# Patient Record
Sex: MALE | Race: BLACK OR AFRICAN AMERICAN | URBAN - METROPOLITAN AREA
[De-identification: names, ages, dates, MRNs, and addresses within clinical notes are randomized per-mention and may not be internally consistent; named-entity substitution may affect disease eponyms.]

---

## 2017-05-25 ENCOUNTER — RADIANT APPOINTMENT (OUTPATIENT)
Dept: GENERAL RADIOLOGY | Facility: CLINIC | Age: 32
End: 2017-05-25
Attending: PHYSICIAN ASSISTANT
Payer: COMMERCIAL

## 2017-05-25 ENCOUNTER — OFFICE VISIT (OUTPATIENT)
Dept: URGENT CARE | Facility: URGENT CARE | Age: 32
End: 2017-05-25
Payer: COMMERCIAL

## 2017-05-25 VITALS
WEIGHT: 169 LBS | DIASTOLIC BLOOD PRESSURE: 63 MMHG | SYSTOLIC BLOOD PRESSURE: 108 MMHG | HEIGHT: 73 IN | TEMPERATURE: 96.5 F | HEART RATE: 63 BPM | OXYGEN SATURATION: 97 % | BODY MASS INDEX: 22.4 KG/M2

## 2017-05-25 DIAGNOSIS — R05.9 COUGH: ICD-10-CM

## 2017-05-25 DIAGNOSIS — R05.9 COUGH: Primary | ICD-10-CM

## 2017-05-25 PROCEDURE — 71020 XR CHEST 2 VW: CPT

## 2017-05-25 PROCEDURE — 99213 OFFICE O/P EST LOW 20 MIN: CPT | Performed by: PHYSICIAN ASSISTANT

## 2017-05-25 RX ORDER — PSEUDOEPHEDRINE HCL 120 MG/1
120 TABLET, FILM COATED, EXTENDED RELEASE ORAL EVERY 12 HOURS
Qty: 20 TABLET | Refills: 0 | Status: SHIPPED | OUTPATIENT
Start: 2017-05-25

## 2017-05-25 RX ORDER — LORATADINE 10 MG/1
10 TABLET ORAL DAILY
COMMUNITY

## 2017-05-25 NOTE — MR AVS SNAPSHOT
"              After Visit Summary   2017    Arnol Garzon    MRN: [id number]           Patient Information     Date Of Birth          1985        Visit Information        Provider Department      2017 5:15 PM Abdoul Cross PA-C [facility]gent [facility]        Today's Diagnoses     Cough    -  1       Follow-ups after your visit        Who to contact     If you have questions or need follow up information about today's clinic visit or your schedule please contact [facility] directly at [phone].  Normal or non-critical lab and imaging results will be communicated to you by [employer] Access[employer], letter or phone within 4 business days after the clinic has received the results. If you do not hear from us within 7 days, please contact the clinic through Oxonicat or phone. If you have a critical or abnormal lab result, we will notify you by phone as soon as possible.  Submit refill requests through [employer] or call your pharmacy and they will forward the refill request to us. Please allow 3 business days for your refill to be completed.          Additional Information About Your Visit        MyChart Information     [employer] lets you send messages to your doctor, view your test results, renew your prescriptions, schedule appointments and more. To sign up, go to [email or web address] . Click on \"Log in\" on the left side of the screen, which will take you to the Welcome page. Then click on \"Sign up Now\" on the right side of the page.     You will be asked to enter the access code listed below, as well as some personal information. Please follow the directions to create your username and password.     Your access code is: [email or web address]  Expires: 2017  7:22 PM     Your access code will  in 90 days. If you need help or a new code, please call your [place] clinic or [phone].        Care EveryWhere ID     This is your Care EveryWhere ID. This could be used by other " "organizations to access your [place] medical records  [id number]        Your Vitals Were     Pulse Temperature Height Pulse Oximetry BMI (Body Mass Index)       63 96.5  F (35.8  C) (Oral) 6' 1\" (1.854 m) 97% 22.3 kg/m2        Blood Pressure from Last 3 Encounters:   05/25/17 108/63   12/01/16 122/70   12/01/16 108/65    Weight from Last 3 Encounters:   05/25/17 169 lb (76.7 kg)   12/01/16 170 lb (77.1 kg)   12/01/16 170 lb 4 oz (77.2 kg)                 Today's Medication Changes          These changes are accurate as of: 5/25/17  7:22 PM.  If you have any questions, ask your nurse or doctor.               Start taking these medicines.        Dose/Directions    pseudoePHEDrine 120 MG 12 hr tablet   Commonly known as:  SUDAFED   Used for:  Cough   Started by:  Abdoul Cross PA-C        Dose:  120 mg   Take 1 tablet (120 mg) by mouth every 12 hours   Quantity:  20 tablet   Refills:  0            Where to get your medicines      Some of these will need a paper prescription and others can be bought over the counter.  Ask your nurse if you have questions.     Bring a paper prescription for each of these medications     pseudoePHEDrine 120 MG 12 hr tablet                Primary Care Provider    None       No address on file        Thank you!     Thank you for choosing [facility]  for your care. Our goal is always to provide you with excellent care. Hearing back from our patients is one way we can continue to improve our services. Please take a few minutes to complete the written survey that you may receive in the mail after your visit with us. Thank you!             Your Updated Medication List - Protect others around you: Learn how to safely use, store and throw away your medicines at www.disposemymeds.org.          This list is accurate as of: 5/25/17  7:22 PM.  Always use your most recent med list.                   Brand Name Dispense Instructions for use    loratadine 10 MG tablet    " CLARITIN     Take 10 mg by mouth daily       NO ACTIVE MEDICATIONS          pseudoePHEDrine 120 MG 12 hr tablet    SUDAFED    20 tablet    Take 1 tablet (120 mg) by mouth every 12 hours

## 2017-05-25 NOTE — NURSING NOTE
"Chief Complaint   Patient presents with     Urgent Care     Pt in clinic c/o chest discomfort with coughing, congestion, and sinus pain with headache.     Respiratory Problems     Sinus Problem     Headache       Initial /63  Pulse 63  Temp 96.5  F (35.8  C) (Oral)  Ht 6' 1\" (1.854 m)  Wt 169 lb (76.7 kg)  SpO2 97%  BMI 22.3 kg/m2 Estimated body mass index is 22.3 kg/(m^2) as calculated from the following:    Height as of this encounter: 6' 1\" (1.854 m).    Weight as of this encounter: 169 lb (76.7 kg).  Medication Reconciliation: complete   Vonda Cross/ MA    "

## 2017-05-25 NOTE — PROGRESS NOTES
"SUBJECTIVE:  Arnol Garzon is a 31 year old male here with concerns about upper respiratory infection.  He states onset of symptoms were 3 day(s) ago.  He has had maxillary pressure. Course of illness is worsening. Severity moderate  Current and Associated symptoms: fever, nasal congestion, cough  and facial pain/pressure  Predisposing factors include recent sick contacts. Recent treatment has included: Antihistamine    No past medical history on file.  Social History   Substance Use Topics     Smoking status: Current Every Day Smoker     Smokeless tobacco: Never Used     Alcohol use 0.0 oz/week     0 Standard drinks or equivalent per week      Comment: 2/ wk       ROS:  CONSTITUTIONAL:NEGATIVE for fever, chills, change in weight  ENT/MOUTH: POSITIVE for nasal congestion  RESP:cough-productive    OBJECTIVE:  /63  Pulse 63  Temp 96.5  F (35.8  C) (Oral)  Ht 6' 1\" (1.854 m)  Wt 169 lb (76.7 kg)  SpO2 97%  BMI 22.3 kg/m2  Exam:GENERAL APPEARANCE: healthy, alert and no distress  EYES: EOMI,  PERRL, conjunctiva clear  HENT: ear canals and TM's normal.  Nose and mouth without ulcers, erythema or lesions  NECK: supple, nontender, no lymphadenopathy  RESP: lungs clear to auscultation - no rales, rhonchi or wheezes  CV: regular rates and rhythm, normal S1 S2, no murmur noted  ABDOMEN:  soft, nontender, no HSM or masses and bowel sounds normal  NEURO: Normal strength and tone, sensory exam grossly normal,  normal speech and mentation  SKIN: no suspicious lesions or rashes    Chest xray: negative    ASSESSMENT:    1. Cough    - XR Chest 2 Views; Future  - pseudoePHEDrine (SUDAFED) 120 MG 12 hr tablet; Take 1 tablet (120 mg) by mouth every 12 hours  Dispense: 20 tablet; Refill: 0  PLAN:  See orders  Follow up with primary clinic if not improving over the next week.     "

## 2017-05-25 NOTE — LETTER
[facility] URGENT CARE  [address] [address]  Phone: [phone]    May 25, 2017        Arnol Garzon  [address] [place] [address]          To whom it may concern:    RE: Arnol Garzon    Patient was seen and treated today at our clinic.    Please contact me for questions or concerns.      Sincerely,        Abdoul Cross PA-C

## 2017-11-01 ENCOUNTER — OFFICE VISIT (OUTPATIENT)
Dept: FAMILY MEDICINE | Facility: CLINIC | Age: 32
End: 2017-11-01
Payer: COMMERCIAL

## 2017-11-01 VITALS
OXYGEN SATURATION: 98 % | HEART RATE: 97 BPM | BODY MASS INDEX: 22.56 KG/M2 | DIASTOLIC BLOOD PRESSURE: 58 MMHG | RESPIRATION RATE: 12 BRPM | SYSTOLIC BLOOD PRESSURE: 92 MMHG | WEIGHT: 171 LBS | TEMPERATURE: 97.7 F

## 2017-11-01 DIAGNOSIS — R19.4 CHANGE IN STOOL HABITS: Primary | ICD-10-CM

## 2017-11-01 PROCEDURE — 99213 OFFICE O/P EST LOW 20 MIN: CPT | Performed by: NURSE PRACTITIONER

## 2017-11-01 NOTE — PROGRESS NOTES
SUBJECTIVE:   Arnol Garzon is a 31 year old male who presents to clinic today for the following health issues:      Diarrhea      Duration: 1 week    Description:       Consistency of stool: runny       Blood in stool: no        Number of loose stools past 24 hours: 2    Intensity:  moderate    Accompanying signs and symptoms:       Fever: no        Nausea/vomitting: no        Abdominal pain: YES       Weight loss: no     History (recent antibiotics or travel/ill contacts/med changes/testing done): none    Precipitating or alleviating factors: None    Therapies tried and outcome: took 1 tablet of something form boss. Did not help    diarrhea x 1 week, last 2 days only had 1 BM daily.  initially having liquid stools 3 daily.  Stools are lose, not liquid.  No blood in stool or fever.  No nausea or vomiting.  Eating modified diet.  No abd pain, no heartburn/  Mild nausea after eating, no emesis.  Mild bloating/gas.      Diarrhea worse this morning.  Had 1 BM this morning but was in there for a while.  Missed work this morning.  No pain with BM.      No testicular pain or dysuria.      No recent diet changed.  No recent travel.  No recent eating out.      Patient Active Problem List   Diagnosis     CARDIOVASCULAR SCREENING; LDL GOAL LESS THAN 160     History reviewed. No pertinent surgical history.    Social History   Substance Use Topics     Smoking status: Current Every Day Smoker     Smokeless tobacco: Never Used     Alcohol use 0.0 oz/week     0 Standard drinks or equivalent per week      Comment: 2/ wk     Family History   Problem Relation Age of Onset     Allergies Sister          Current Outpatient Prescriptions   Medication Sig Dispense Refill     NO ACTIVE MEDICATIONS        loratadine (CLARITIN) 10 MG tablet Take 10 mg by mouth daily       pseudoePHEDrine (SUDAFED) 120 MG 12 hr tablet Take 1 tablet (120 mg) by mouth every 12 hours (Patient not taking: Reported on 11/1/2017) 20 tablet 0       ROS:  Const,  GI, Gu as above, otherwise negative       OBJECTIVE:                                                    BP 92/58 (BP Location: Left arm, Patient Position: Chair, Cuff Size: Adult Regular)  Pulse 97  Temp 97.7  F (36.5  C) (Oral)  Resp 12  Wt 171 lb (77.6 kg)  SpO2 98%  BMI 22.56 kg/m2   GENERAL APPEARANCE: healthy, alert and no distress.  Room smells heavily of marijuana   EYES: Eyes grossly normal to inspection and conjunctivae and sclerae normal  RESP: lungs clear to auscultation - no rales, rhonchi or wheezes  ABDOMEN: soft, nontender, without hepatosplenomegaly or masses and bowel sounds normal  PSYCH: cognition seems slow, pot slow to respond to questions         ASSESSMENT/PLAN:                                                    (R19.4) Change in stool habits  (primary encounter diagnosis)  Comment: 1-3 loose BM x 1 week, no fever and normal exam, low suspicion for diverticulitis or appendicitis.  intensity doesn't seem severe enough to be gastroenteritis but may be mild viral infection.  No recent travel or diet change.  The room smells heavily of marijuana and I wonder if pt is more in search of work excuse    Plan: recommend high fiber diet, over the counter imodium as needed.  Follow up if worsening or not improving in 1 week.  Reviewed red flag symptoms         See Patient Instructions    Jeanette Fletcher, [facility]    Patient Instructions   1.  No red flag symptoms for diarrhea.  Recommend high fiber diet (fresh fruits, veggies, whole grains), try to avoid igh fat foods like red meat, anything fried, baked good, or convenience foods.  Ok to take over the counter imodium as needed for diarrhea.  Follow up if worsening or not improving in 1 week.  If fever or severe pain go to the ER.

## 2017-11-01 NOTE — LETTER
[address] [address]  Phone: [phone]    November 1, 2017        Arnol Garzon  [address] [place] [address]          To whom it may concern:    RE: Arnol Garzon    Patient was seen and treated today at our clinic and missed work due to medical illness.    Please contact me for questions or concerns.      Sincerely,        RAEANN George CNP

## 2017-11-01 NOTE — MR AVS SNAPSHOT
"              After Visit Summary   11/1/2017    Arnol Garzon    MRN: [id number]           Patient Information     Date Of Birth          1985        Visit Information        Provider Department      11/1/2017 9:20 AM Jeanette Fletcher APRN CNP [facility]        Care Instructions    1.  No red flag symptoms for diarrhea.  Recommend high fiber diet (fresh fruits, veggies, whole grains), try to avoid igh fat foods like red meat, anything fried, baked good, or convenience foods.  Ok to take over the counter imodium as needed for diarrhea.  Follow up if worsening or not improving in 1 week.  If fever or severe pain go to the ER.          Follow-ups after your visit        Who to contact     If you have questions or need follow up information about today's clinic visit or your schedule please contact [facility] directly at [phone].  Normal or non-critical lab and imaging results will be communicated to you by MyChart, letter or phone within 4 business days after the clinic has received the results. If you do not hear from us within 7 days, please contact the clinic through vozerohart or phone. If you have a critical or abnormal lab result, we will notify you by phone as soon as possible.  Submit refill requests through [employer] or call your pharmacy and they will forward the refill request to us. Please allow 3 business days for your refill to be completed.          Additional Information About Your Visit        MyChart Information     [employer] lets you send messages to your doctor, view your test results, renew your prescriptions, schedule appointments and more. To sign up, go to [email or web address] . Click on \"Log in\" on the left side of the screen, which will take you to the Welcome page. Then click on \"Sign up Now\" on the right side of the page.     You will be asked to enter the access code listed below, as well as some personal information. Please follow the " directions to create your username and password.     Your access code is: [email or web address]  Expires: 2018  9:51 AM     Your access code will  in 90 days. If you need help or a new code, please call your [place] clinic or [phone].        Care EveryWhere ID     This is your Care EveryWhere ID. This could be used by other organizations to access your [place] medical records  [id number]        Your Vitals Were     Pulse Temperature Respirations Pulse Oximetry BMI (Body Mass Index)       97 97.7  F (36.5  C) (Oral) 12 98% 22.56 kg/m2        Blood Pressure from Last 3 Encounters:   17 92/58   17 108/63   16 122/70    Weight from Last 3 Encounters:   17 171 lb (77.6 kg)   17 169 lb (76.7 kg)   16 170 lb (77.1 kg)              Today, you had the following     No orders found for display       Primary Care Provider    [facility] Provider Generic, MD       No address on file        Equal Access to Services     [facility]: Hadii aad jaz hadasho Sohalie, waaxda luqadaha, qaybta kaalmada ede, krystle landeros . So [facility] [phone].    ATENCIÓN: Si habla español, tiene a husain disposición servicios gratuitos de asistencia lingüística. Llame al [phone].    We comply with applicable federal civil rights laws and [place] laws. We do not discriminate on the basis of race, color, national origin, age, disability, sex, sexual orientation, or gender identity.            Thank you!     Thank you for choosing [facility]  for your care. Our goal is always to provide you with excellent care. Hearing back from our patients is one way we can continue to improve our services. Please take a few minutes to complete the written survey that you may receive in the mail after your visit with us. Thank you!             Your Updated Medication List - Protect others around you: Learn how to safely use, store and throw away your medicines at  www.disposemymeds.org.          This list is accurate as of: 11/1/17  9:51 AM.  Always use your most recent med list.                   Brand Name Dispense Instructions for use Diagnosis    loratadine 10 MG tablet    CLARITIN     Take 10 mg by mouth daily        NO ACTIVE MEDICATIONS           pseudoePHEDrine 120 MG 12 hr tablet    SUDAFED    20 tablet    Take 1 tablet (120 mg) by mouth every 12 hours    Cough

## 2017-11-01 NOTE — PATIENT INSTRUCTIONS
1.  No red flag symptoms for diarrhea.  Recommend high fiber diet (fresh fruits, veggies, whole grains), try to avoid igh fat foods like red meat, anything fried, baked good, or convenience foods.  Ok to take over the counter imodium as needed for diarrhea.  Follow up if worsening or not improving in 1 week.  If fever or severe pain go to the ER.

## 2021-08-21 ENCOUNTER — HOSPITAL ENCOUNTER (EMERGENCY)
Facility: CLINIC | Age: 36
Discharge: LEFT BEFORE DISCHARGE | End: 2021-08-21
Payer: COMMERCIAL